# Patient Record
Sex: MALE | Race: WHITE | NOT HISPANIC OR LATINO | Employment: OTHER | URBAN - METROPOLITAN AREA
[De-identification: names, ages, dates, MRNs, and addresses within clinical notes are randomized per-mention and may not be internally consistent; named-entity substitution may affect disease eponyms.]

---

## 2017-02-24 ENCOUNTER — OFFICE VISIT (OUTPATIENT)
Dept: URGENT CARE | Facility: CLINIC | Age: 52
End: 2017-02-24
Payer: COMMERCIAL

## 2017-02-24 VITALS
OXYGEN SATURATION: 95 % | SYSTOLIC BLOOD PRESSURE: 118 MMHG | BODY MASS INDEX: 23.37 KG/M2 | RESPIRATION RATE: 16 BRPM | DIASTOLIC BLOOD PRESSURE: 80 MMHG | TEMPERATURE: 97.9 F | HEART RATE: 62 BPM | WEIGHT: 180 LBS

## 2017-02-24 DIAGNOSIS — S49.92XA SHOULDER INJURY, LEFT, INITIAL ENCOUNTER: ICD-10-CM

## 2017-02-24 PROCEDURE — 99213 OFFICE O/P EST LOW 20 MIN: CPT | Performed by: NURSE PRACTITIONER

## 2017-02-24 ASSESSMENT — ENCOUNTER SYMPTOMS: CONSTITUTIONAL NEGATIVE: 1

## 2017-02-24 NOTE — MR AVS SNAPSHOT
Sharath Salazar   2017 5:15 PM   Office Visit   MRN: [id number]    Department:  [facility]   Dept Phone:  [phone]    Description:  Male : 1965   Provider:  Cathey J Hamman, A.P.N.           Reason for Visit     Shoulder Pain x yesterday, Lt. shoulder pain after fall      Allergies as of 2017     Allergen Noted Reactions    Nkda [No Known Drug Allergy] 2012         Vital Signs     Blood Pressure Pulse Temperature Respirations Weight Oxygen Saturation    118/80 mmHg 62 36.6 °C (97.9 °F) 16 81.647 kg (180 lb) 95%    Smoking Status                   Never Smoker            Basic Information     Date Of Birth Sex Race Ethnicity Preferred Language    1965 Male White Non- English      Problem List              ICD-10-CM Priority Class Noted - Resolved    V-tach (CMS-HCC) I47.2   2012 - Present    Encounter for servicing of automatic implantable cardioverter-defibrillator (AICD) at end of battery life Z45.02   2012 - Present      Health Maintenance        Date Due Completion Dates    IMM DTaP/Tdap/Td Vaccine (1 - Tdap) 1985 ---    IMM INFLUENZA (1) 2016 ---    COLONOSCOPY 2016 ---            Current Immunizations     No immunizations on file.      Below and/or attached are the medications your provider expects you to take. Review all of your home medications and newly ordered medications with your provider and/or pharmacist. Follow medication instructions as directed by your provider and/or pharmacist. Please keep your medication list with you and share with your provider. Update the information when medications are discontinued, doses are changed, or new medications (including over-the-counter products) are added; and carry medication information at all times in the event of emergency situations     Allergies:  NKDA - (reactions not documented)               Medications  Valid as of: 2017 -  7:03 PM    Generic Name Brand Name  Tablet Size Instructions for use    Acetaminophen   Take  by mouth.        DiphenhydrAMINE HCl (Cap) BENADRYL 50 MG Take 50 mg by mouth every 6 hours as needed.          Hydrocodone-Acetaminophen (Tab) NORCO 5-325 MG Take 1-2 Tabs by mouth every four hours as needed.        Penicillin V Potassium (Tab) VEETID 500 MG Take 1 Tab by mouth 4 Times a Day,Before Meals and at Bedtime.        .                 Medicines prescribed today were sent to:     [id number] - [place], [place] - [address]    [address] [place] [address]    Phone: [phone] Fax: [phone]    Open 24 Hours?: No      Medication refill instructions:       If your prescription bottle indicates you have medication refills left, it is not necessary to call your provider’s office. Please contact your pharmacy and they will refill your medication.    If your prescription bottle indicates you do not have any refills left, you may request refills at any time through one of the following ways: The online [employer] system (except Urgent Care), by calling your provider’s office, or by asking your pharmacy to contact your provider’s office with a refill request. Medication refills are processed only during regular business hours and may not be available until the next business day. Your provider may request additional information or to have a follow-up visit with you prior to refilling your medication.   *Please Note: Medication refills are assigned a new Rx number when refilled electronically. Your pharmacy may indicate that no refills were authorized even though a new prescription for the same medication is available at the pharmacy. Please request the medicine by name with the pharmacy before contacting your provider for a refill.           [employer] Access Code: [email or web address]  Expires: 3/26/2017  7:03 PM    [employer]  A secure, online tool to manage your health information     [employer]’s [employer]® is a secure, online tool that connects  you to your personalized health information from the privacy of your home -- day or night - making it very easy for you to manage your healthcare. Once the activation process is completed, you can even access your medical information using the [employer] minnie, which is available for free in the Apple Minnie store or Google Play store.     [employer] provides the following levels of access (as shown below):   My Chart Features   [facility] Doctor [facility]  Specialists [facility]  Urgent  Care Non-[facility]  Primary Care  Doctor   Email your healthcare team securely and privately 24/7 X X X    Manage appointments: schedule your next appointment; view details of past/upcoming appointments X      Request prescription refills. X      View recent personal medical records, including lab and immunizations X X X X   View health record, including health history, allergies, medications X X X X   Read reports about your outpatient visits, procedures, consult and ER notes X X X X   See your discharge summary, which is a recap of your hospital and/or ER visit that includes your diagnosis, lab results, and care plan. X X       How to register for [employer]:  1. Go to  [email or web address].  2. Click on the Sign Up Now box, which takes you to the New Member Sign Up page. You will need to provide the following information:  a. Enter your [employer] Access Code exactly as it appears at the top of this page. (You will not need to use this code after you’ve completed the sign-up process. If you do not sign up before the expiration date, you must request a new code.)   b. Enter your date of birth.   c. Enter your home email address.   d. Click Submit, and follow the next screen’s instructions.  3. Create a [employer] ID. This will be your [employer] login ID and cannot be changed, so think of one that is secure and easy to remember.  4. Create a [employer] password. You can change your password at any time.  5. Enter your Password Reset Question and  Answer. This can be used at a later time if you forget your password.   6. Enter your e-mail address. This allows you to receive e-mail notifications when new information is available in [employer].  7. Click Sign Up. You can now view your health information.    For assistance activating your [employer] account, call [phone]

## 2017-02-25 NOTE — PROGRESS NOTES
Subjective:      Sharath Salazar is a 51 y.o. male who presents with Shoulder Pain    Past Medical History   Diagnosis Date   • Arrhythmia      Social History     Social History   • Marital Status:      Spouse Name: N/A   • Number of Children: N/A   • Years of Education: N/A     Occupational History   • Not on file.     Social History Main Topics   • Smoking status: Never Smoker    • Smokeless tobacco: Never Used   • Alcohol Use: 0.5 - 2.5 oz/week     1-5 drink(s) per week   • Drug Use: No   • Sexual Activity:     Partners: Female     Other Topics Concern   • Not on file     Social History Narrative     Family hx: not pertinent to today's visit         Shoulder Pain  This is a new problem. The current episode started yesterday. The problem occurs constantly. The problem has been unchanged. Nothing aggravates the symptoms. He has tried ice, rest and NSAIDs for the symptoms. The treatment provided mild relief.       Review of Systems   Constitutional: Negative.    Musculoskeletal:        Left shoulder pain       All other systems reviewed and are negative      Objective:     /80 mmHg  Pulse 62  Temp(Src) 36.6 °C (97.9 °F)  Resp 16  Wt 81.647 kg (180 lb)  SpO2 95%     Physical Exam   Constitutional: He is oriented to person, place, and time. He appears well-developed and well-nourished. No distress.   Musculoskeletal:        Left shoulder: He exhibits decreased range of motion.        Arms:   5/5 and equal in the upper extremities. Push pull is 5/5 and equal in the upper extremities. There is no deformity noted over the left shoulder. There is mild point tenderness over the anterior left shoulder. Patient is unable to abduct anterior or laterally greater than 20°. Posterior abduction is not compromised. Patient is not able to adduct across the midline.   Neurological: He is alert and oriented to person, place, and time.   Skin: Skin is warm and dry. He is not diaphoretic.   Psychiatric: He  has a normal mood and affect. His behavior is normal.   Vitals reviewed.    Discussed doing a shoulder x-ray with the patient. As he is not having pain, he declined x-ray at this time. He states he has had multiple x-rays throughout his life and he does not want to have any possible and necessary x-rays. Patient states also that he cannot have an MRI due to an implanted pacemaker. At this time, I discussed conservative treatment with the patient to include ice and NSAIDs with rest. I advised the patient that if his symptoms are not improving within the next 10 days to 2 weeks that I do recommend follow-up. I advised him that he may either follow up with us or with orthopedics. Patient does request referral to orthopedics at this time and that was placed. Patient has no further questions at this time.          Assessment/Plan:   Left shoulder injury  Left shoulder sprain  -Referral to orthopedics  -NSAIDs  -Ice  -Rest  -Follow up otherwise for any further questions or concerns  There are no diagnoses linked to this encounter.

## 2017-05-11 PROBLEM — C44.519 BASAL CELL CARCINOMA OF SKIN OF OTHER PART OF TRUNK: Status: ACTIVE | Noted: 2017-05-11

## 2017-05-25 PROBLEM — D49.2 NEOPLASM OF UNSPECIFIED BEHAVIOR OF BONE, SOFT TISSUE, AND SKIN: Status: RESOLVED | Noted: 2017-05-11 | Resolved: 2017-05-25

## 2017-11-29 ENCOUNTER — OFFICE VISIT (OUTPATIENT)
Dept: MEDICAL GROUP | Facility: MEDICAL CENTER | Age: 52
End: 2017-11-29
Payer: COMMERCIAL

## 2017-11-29 VITALS
TEMPERATURE: 98.6 F | BODY MASS INDEX: 24.94 KG/M2 | SYSTOLIC BLOOD PRESSURE: 110 MMHG | HEIGHT: 73 IN | OXYGEN SATURATION: 94 % | RESPIRATION RATE: 16 BRPM | DIASTOLIC BLOOD PRESSURE: 60 MMHG | HEART RATE: 61 BPM | WEIGHT: 188.2 LBS

## 2017-11-29 DIAGNOSIS — Z98.890 HISTORY OF SHOULDER SURGERY: ICD-10-CM

## 2017-11-29 DIAGNOSIS — Z95.810 AICD (AUTOMATIC CARDIOVERTER/DEFIBRILLATOR) PRESENT: ICD-10-CM

## 2017-11-29 DIAGNOSIS — Z13.6 SCREENING FOR CARDIOVASCULAR CONDITION: ICD-10-CM

## 2017-11-29 DIAGNOSIS — Z12.5 SCREENING PSA (PROSTATE SPECIFIC ANTIGEN): ICD-10-CM

## 2017-11-29 DIAGNOSIS — Z00.00 WELLNESS EXAMINATION: ICD-10-CM

## 2017-11-29 DIAGNOSIS — Z30.09 ENCOUNTER FOR VASECTOMY COUNSELING: ICD-10-CM

## 2017-11-29 DIAGNOSIS — Z23 NEED FOR INFLUENZA VACCINATION: ICD-10-CM

## 2017-11-29 DIAGNOSIS — Z12.11 SCREENING FOR COLON CANCER: ICD-10-CM

## 2017-11-29 PROCEDURE — 99396 PREV VISIT EST AGE 40-64: CPT | Mod: 25 | Performed by: PHYSICIAN ASSISTANT

## 2017-11-29 PROCEDURE — 90471 IMMUNIZATION ADMIN: CPT | Performed by: PHYSICIAN ASSISTANT

## 2017-11-29 PROCEDURE — 90686 IIV4 VACC NO PRSV 0.5 ML IM: CPT | Performed by: PHYSICIAN ASSISTANT

## 2017-11-29 ASSESSMENT — PATIENT HEALTH QUESTIONNAIRE - PHQ9: CLINICAL INTERPRETATION OF PHQ2 SCORE: 0

## 2017-11-29 NOTE — ASSESSMENT & PLAN NOTE
Left shoulder. Over the summer. Torn bicips tendon and rotator cuff. Doing well. Still in PT. Dr. Saavedra.

## 2017-11-29 NOTE — PROGRESS NOTES
Chief Complaint   Patient presents with   • Establish Care     due for labs   • Annual Exam       HISTORY OF THE PRESENT ILLNESS: This is a 52 y.o. male new patient to our practice. This pleasant patient is here today for general PE, wellness exam    No concerns. Doing well. Seeing derm yearly. Needs colonoscopy. Would like vasectomy. Due for regular labs    History of shoulder surgery  Left shoulder. Over the summer. Torn bicips tendon and rotator cuff. Doing well. Still in PT. Dr. Saavedra.    AICD (automatic cardioverter/defibrillator) present  Hx of idiopathic v tach. Regular checks with cardiology. No concerns today.      Past Medical History:   Diagnosis Date   • Arrhythmia        Past Surgical History:   Procedure Laterality Date   • SHOULDER ARTHROSCOPY W/ ROTATOR CUFF REPAIR         Family Status   Relation Status   • Mother Alive   • Father Alive     Family History   Problem Relation Age of Onset   • No Known Problems Mother    • No Known Problems Father        Social History   Substance Use Topics   • Smoking status: Never Smoker   • Smokeless tobacco: Never Used   • Alcohol use 0.5 - 2.5 oz/week     1 - 5 Standard drinks or equivalent per week      Comment: rarely       Allergies: Nkda [no known drug allergy]    No current [employer]-ordered outpatient prescriptions on file.     No current [employer]-ordered facility-administered medications on file.        Review of Systems   Constitutional: Negative for fever, chills, weight loss and malaise/fatigue.   HENT: Negative for ear pain, nosebleeds, congestion, sore throat and neck pain.    Eyes: Negative for blurred vision.   Respiratory: Negative for cough, sputum production, shortness of breath and wheezing.    Cardiovascular: Negative for chest pain, palpitations, orthopnea and leg swelling.   Gastrointestinal: Negative for heartburn, nausea, vomiting and abdominal pain.   Genitourinary: Negative for dysuria, urgency and frequency.   Musculoskeletal: Negative for  "myalgias, back pain and joint pain.   Skin: Negative for rash and itching.   Neurological: Negative for dizziness, tingling, tremors, sensory change, focal weakness and headaches.   Endo/Heme/Allergies: Does not bruise/bleed easily.   Psychiatric/Behavioral: Negative for depression, anxiety, or memory loss.     All other systems reviewed and are negative except as in HPI.    Exam: Blood pressure 110/60, pulse 61, temperature 37 °C (98.6 °F), resp. rate 16, height 1.854 m (6' 1\"), weight 85.4 kg (188 lb 3.2 oz), SpO2 94 %.  General: Normal appearing. No distress.  HEENT: Normocephalic. Eyes conjunctiva clear lids without ptosis, pupils equal and reactive to light accommodation, ears normal shape and contour, canals are clear bilaterally, tympanic membranes are benign, nasal mucosa benign, oropharynx is without erythema, edema or exudates.   Neck: Supple without JVD or bruit. Thyroid is not enlarged.  Pulmonary: Clear to ausculation.  Normal effort. No rales, ronchi, or wheezing.  Cardiovascular: Regular rate and rhythm without murmur. Carotid and radial pulses are intact and equal bilaterally.  Neurologic: Grossly nonfocal  Lymph: No cervical, supraclavicular or axillary lymph nodes are palpable  Skin: Warm and dry.  No obvious lesions.  Musculoskeletal: Normal gait. No extremity cyanosis, clubbing, or edema.  Psych: Normal mood and affect. Alert and oriented x3. Judgment and insight is normal.      Assessment/Plan  1. Wellness examination  CBC WITH DIFFERENTIAL    COMP METABOLIC PANEL    TSH WITH REFLEX TO FT4    VITAMIN D,25 HYDROXY   2. Screening for cardiovascular condition  LIPID PROFILE   3. Screening PSA (prostate specific antigen)  PROSTATE SPECIFIC AG SCREENING   4. Screening for colon cancer  REFERRAL TO GASTROENTEROLOGY   5. Encounter for vasectomy counseling  REFERRAL TO UROLOGY   6. Need for influenza vaccination  INFLUENZA VACCINE QUAD INJ >3Y(PF)   7. AICD (automatic cardioverter/defibrillator) present "     8. History of shoulder surgery       F/u yearly and PRN

## 2017-12-01 ENCOUNTER — HOSPITAL ENCOUNTER (OUTPATIENT)
Dept: LAB | Facility: MEDICAL CENTER | Age: 52
End: 2017-12-01
Attending: PHYSICIAN ASSISTANT
Payer: COMMERCIAL

## 2017-12-01 DIAGNOSIS — Z12.5 SCREENING PSA (PROSTATE SPECIFIC ANTIGEN): ICD-10-CM

## 2017-12-01 DIAGNOSIS — Z00.00 WELLNESS EXAMINATION: ICD-10-CM

## 2017-12-01 DIAGNOSIS — Z13.6 SCREENING FOR CARDIOVASCULAR CONDITION: ICD-10-CM

## 2017-12-01 LAB
25(OH)D3 SERPL-MCNC: 31 NG/ML (ref 30–100)
ALBUMIN SERPL BCP-MCNC: 4.2 G/DL (ref 3.2–4.9)
ALBUMIN/GLOB SERPL: 1.8 G/DL
ALP SERPL-CCNC: 51 U/L (ref 30–99)
ALT SERPL-CCNC: 17 U/L (ref 2–50)
ANION GAP SERPL CALC-SCNC: 8 MMOL/L (ref 0–11.9)
AST SERPL-CCNC: 20 U/L (ref 12–45)
BASOPHILS # BLD AUTO: 0.7 % (ref 0–1.8)
BASOPHILS # BLD: 0.04 K/UL (ref 0–0.12)
BILIRUB SERPL-MCNC: 0.7 MG/DL (ref 0.1–1.5)
BUN SERPL-MCNC: 24 MG/DL (ref 8–22)
CALCIUM SERPL-MCNC: 9.6 MG/DL (ref 8.5–10.5)
CHLORIDE SERPL-SCNC: 103 MMOL/L (ref 96–112)
CHOLEST SERPL-MCNC: 218 MG/DL (ref 100–199)
CO2 SERPL-SCNC: 29 MMOL/L (ref 20–33)
CREAT SERPL-MCNC: 0.87 MG/DL (ref 0.5–1.4)
EOSINOPHIL # BLD AUTO: 0.33 K/UL (ref 0–0.51)
EOSINOPHIL NFR BLD: 5.8 % (ref 0–6.9)
ERYTHROCYTE [DISTWIDTH] IN BLOOD BY AUTOMATED COUNT: 44.8 FL (ref 35.9–50)
GFR SERPL CREATININE-BSD FRML MDRD: >60 ML/MIN/1.73 M 2
GLOBULIN SER CALC-MCNC: 2.4 G/DL (ref 1.9–3.5)
GLUCOSE SERPL-MCNC: 82 MG/DL (ref 65–99)
HCT VFR BLD AUTO: 52.5 % (ref 42–52)
HDLC SERPL-MCNC: 52 MG/DL
HGB BLD-MCNC: 17.5 G/DL (ref 14–18)
IMM GRANULOCYTES # BLD AUTO: 0.03 K/UL (ref 0–0.11)
IMM GRANULOCYTES NFR BLD AUTO: 0.5 % (ref 0–0.9)
LDLC SERPL CALC-MCNC: 142 MG/DL
LYMPHOCYTES # BLD AUTO: 1.45 K/UL (ref 1–4.8)
LYMPHOCYTES NFR BLD: 25.5 % (ref 22–41)
MCH RBC QN AUTO: 30.6 PG (ref 27–33)
MCHC RBC AUTO-ENTMCNC: 33.3 G/DL (ref 33.7–35.3)
MCV RBC AUTO: 91.9 FL (ref 81.4–97.8)
MONOCYTES # BLD AUTO: 0.47 K/UL (ref 0–0.85)
MONOCYTES NFR BLD AUTO: 8.3 % (ref 0–13.4)
NEUTROPHILS # BLD AUTO: 3.36 K/UL (ref 1.82–7.42)
NEUTROPHILS NFR BLD: 59.2 % (ref 44–72)
NRBC # BLD AUTO: 0 K/UL
NRBC BLD AUTO-RTO: 0 /100 WBC
PLATELET # BLD AUTO: 256 K/UL (ref 164–446)
PMV BLD AUTO: 11.7 FL (ref 9–12.9)
POTASSIUM SERPL-SCNC: 4.1 MMOL/L (ref 3.6–5.5)
PROT SERPL-MCNC: 6.6 G/DL (ref 6–8.2)
PSA SERPL-MCNC: 0.81 NG/ML (ref 0–4)
RBC # BLD AUTO: 5.71 M/UL (ref 4.7–6.1)
SODIUM SERPL-SCNC: 140 MMOL/L (ref 135–145)
TRIGL SERPL-MCNC: 121 MG/DL (ref 0–149)
TSH SERPL DL<=0.005 MIU/L-ACNC: 1.99 UIU/ML (ref 0.3–3.7)
WBC # BLD AUTO: 5.7 K/UL (ref 4.8–10.8)

## 2017-12-01 PROCEDURE — 85025 COMPLETE CBC W/AUTO DIFF WBC: CPT

## 2017-12-01 PROCEDURE — 82306 VITAMIN D 25 HYDROXY: CPT

## 2017-12-01 PROCEDURE — 84153 ASSAY OF PSA TOTAL: CPT

## 2017-12-01 PROCEDURE — 80061 LIPID PANEL: CPT

## 2017-12-01 PROCEDURE — 36415 COLL VENOUS BLD VENIPUNCTURE: CPT

## 2017-12-01 PROCEDURE — 80053 COMPREHEN METABOLIC PANEL: CPT

## 2017-12-01 PROCEDURE — 84443 ASSAY THYROID STIM HORMONE: CPT

## 2017-12-07 ENCOUNTER — APPOINTMENT (RX ONLY)
Dept: URBAN - METROPOLITAN AREA CLINIC 4 | Facility: CLINIC | Age: 52
Setting detail: DERMATOLOGY
End: 2017-12-07

## 2017-12-07 DIAGNOSIS — L57.0 ACTINIC KERATOSIS: ICD-10-CM

## 2017-12-07 PROCEDURE — ? LIQUID NITROGEN

## 2017-12-07 PROCEDURE — ? PRESCRIPTION

## 2017-12-07 PROCEDURE — 17004 DESTROY PREMAL LESIONS 15/>: CPT

## 2017-12-07 RX ORDER — INGENOL MEBUTATE 150 UG/G
GEL TOPICAL
Qty: 1 | Refills: 6 | Status: ERX | COMMUNITY
Start: 2017-12-07

## 2017-12-07 RX ADMIN — INGENOL MEBUTATE: 150 GEL TOPICAL at 16:22

## 2017-12-07 ASSESSMENT — LOCATION DETAILED DESCRIPTION DERM
LOCATION DETAILED: RIGHT SUPERIOR CENTRAL BUCCAL CHEEK
LOCATION DETAILED: LEFT MEDIAL FOREHEAD
LOCATION DETAILED: LEFT INFERIOR CENTRAL MALAR CHEEK
LOCATION DETAILED: LEFT FOREHEAD
LOCATION DETAILED: LEFT SUPERIOR OCCIPITAL SCALP
LOCATION DETAILED: LEFT LATERAL TEMPLE
LOCATION DETAILED: RIGHT MEDIAL FOREHEAD
LOCATION DETAILED: RIGHT INFERIOR FOREHEAD
LOCATION DETAILED: LEFT CENTRAL ZYGOMA
LOCATION DETAILED: LEFT SUPERIOR PARIETAL SCALP
LOCATION DETAILED: RIGHT FOREHEAD
LOCATION DETAILED: RIGHT SUPERIOR CENTRAL MALAR CHEEK
LOCATION DETAILED: RIGHT INFERIOR CENTRAL MALAR CHEEK
LOCATION DETAILED: RIGHT CAVUM CONCHA
LOCATION DETAILED: NASAL DORSUM
LOCATION DETAILED: LEFT CENTRAL PARIETAL SCALP
LOCATION DETAILED: LEFT LATERAL ZYGOMA
LOCATION DETAILED: LEFT CENTRAL TEMPLE
LOCATION DETAILED: RIGHT MEDIAL ZYGOMA
LOCATION DETAILED: RIGHT SUPERIOR PARIETAL SCALP
LOCATION DETAILED: LEFT LATERAL MANDIBULAR CHEEK
LOCATION DETAILED: LEFT INFERIOR LATERAL MALAR CHEEK
LOCATION DETAILED: LEFT NASAL SIDEWALL

## 2017-12-07 ASSESSMENT — LOCATION SIMPLE DESCRIPTION DERM
LOCATION SIMPLE: NOSE
LOCATION SIMPLE: RIGHT CHEEK
LOCATION SIMPLE: RIGHT ZYGOMA
LOCATION SIMPLE: LEFT CHEEK
LOCATION SIMPLE: LEFT NOSE
LOCATION SIMPLE: SCALP
LOCATION SIMPLE: LEFT ZYGOMA
LOCATION SIMPLE: LEFT OCCIPITAL SCALP
LOCATION SIMPLE: RIGHT FOREHEAD
LOCATION SIMPLE: RIGHT EAR
LOCATION SIMPLE: LEFT TEMPLE
LOCATION SIMPLE: LEFT FOREHEAD

## 2017-12-07 ASSESSMENT — LOCATION ZONE DERM
LOCATION ZONE: SCALP
LOCATION ZONE: NOSE
LOCATION ZONE: EAR
LOCATION ZONE: FACE

## 2018-04-05 ENCOUNTER — APPOINTMENT (RX ONLY)
Dept: URBAN - METROPOLITAN AREA CLINIC 4 | Facility: CLINIC | Age: 53
Setting detail: DERMATOLOGY
End: 2018-04-05

## 2018-04-05 DIAGNOSIS — L81.4 OTHER MELANIN HYPERPIGMENTATION: ICD-10-CM

## 2018-04-05 DIAGNOSIS — D22 MELANOCYTIC NEVI: ICD-10-CM

## 2018-04-05 DIAGNOSIS — L82.1 OTHER SEBORRHEIC KERATOSIS: ICD-10-CM

## 2018-04-05 DIAGNOSIS — D18.0 HEMANGIOMA: ICD-10-CM

## 2018-04-05 DIAGNOSIS — L57.0 ACTINIC KERATOSIS: ICD-10-CM

## 2018-04-05 PROBLEM — D22.62 MELANOCYTIC NEVI OF LEFT UPPER LIMB, INCLUDING SHOULDER: Status: ACTIVE | Noted: 2018-04-05

## 2018-04-05 PROBLEM — D22.61 MELANOCYTIC NEVI OF RIGHT UPPER LIMB, INCLUDING SHOULDER: Status: ACTIVE | Noted: 2018-04-05

## 2018-04-05 PROBLEM — D22.5 MELANOCYTIC NEVI OF TRUNK: Status: ACTIVE | Noted: 2018-04-05

## 2018-04-05 PROBLEM — D18.01 HEMANGIOMA OF SKIN AND SUBCUTANEOUS TISSUE: Status: ACTIVE | Noted: 2018-04-05

## 2018-04-05 PROCEDURE — ? COUNSELING

## 2018-04-05 PROCEDURE — 17004 DESTROY PREMAL LESIONS 15/>: CPT

## 2018-04-05 PROCEDURE — ? LIQUID NITROGEN

## 2018-04-05 PROCEDURE — 99213 OFFICE O/P EST LOW 20 MIN: CPT | Mod: 25

## 2018-04-05 ASSESSMENT — LOCATION DETAILED DESCRIPTION DERM
LOCATION DETAILED: RIGHT MEDIAL TEMPLE
LOCATION DETAILED: LEFT SUPERIOR PARIETAL SCALP
LOCATION DETAILED: LEFT VENTRAL PROXIMAL FOREARM
LOCATION DETAILED: RIGHT SUPERIOR CENTRAL MALAR CHEEK
LOCATION DETAILED: RIGHT MEDIAL ZYGOMA
LOCATION DETAILED: LEFT MEDIAL INFERIOR CHEST
LOCATION DETAILED: LEFT ANTERIOR DISTAL UPPER ARM
LOCATION DETAILED: LEFT RADIAL DORSAL HAND
LOCATION DETAILED: LEFT INFERIOR LATERAL NECK
LOCATION DETAILED: RIGHT INFERIOR MEDIAL FOREHEAD
LOCATION DETAILED: RIGHT VENTRAL PROXIMAL FOREARM
LOCATION DETAILED: LEFT INFERIOR UPPER BACK
LOCATION DETAILED: LEFT DISTAL DORSAL FOREARM
LOCATION DETAILED: LEFT CENTRAL FRONTAL SCALP
LOCATION DETAILED: LEFT PROXIMAL DORSAL FOREARM
LOCATION DETAILED: RIGHT ULNAR DORSAL HAND
LOCATION DETAILED: LEFT ULNAR DORSAL HAND
LOCATION DETAILED: LEFT SUPERIOR MEDIAL FOREHEAD
LOCATION DETAILED: LEFT INFERIOR NASAL CHEEK
LOCATION DETAILED: SUPERIOR LUMBAR SPINE
LOCATION DETAILED: RIGHT SUPERIOR PARIETAL SCALP
LOCATION DETAILED: LEFT PROXIMAL RADIAL DORSAL FOREARM
LOCATION DETAILED: EPIGASTRIC SKIN
LOCATION DETAILED: RIGHT CENTRAL MALAR CHEEK
LOCATION DETAILED: LEFT VENTRAL DISTAL FOREARM
LOCATION DETAILED: RIGHT SUPERIOR MEDIAL MIDBACK
LOCATION DETAILED: RIGHT RADIAL DORSAL HAND
LOCATION DETAILED: LEFT ANTECUBITAL SKIN
LOCATION DETAILED: RIGHT INFERIOR FOREHEAD
LOCATION DETAILED: RIGHT ANTERIOR DISTAL UPPER ARM
LOCATION DETAILED: LEFT MEDIAL FRONTAL SCALP

## 2018-04-05 ASSESSMENT — LOCATION SIMPLE DESCRIPTION DERM
LOCATION SIMPLE: LEFT UPPER BACK
LOCATION SIMPLE: LEFT ELBOW
LOCATION SIMPLE: LEFT ANTERIOR NECK
LOCATION SIMPLE: LEFT HAND
LOCATION SIMPLE: LEFT SCALP
LOCATION SIMPLE: RIGHT ZYGOMA
LOCATION SIMPLE: SCALP
LOCATION SIMPLE: LEFT UPPER ARM
LOCATION SIMPLE: RIGHT HAND
LOCATION SIMPLE: LOWER BACK
LOCATION SIMPLE: CHEST
LOCATION SIMPLE: RIGHT LOWER BACK
LOCATION SIMPLE: LEFT FOREARM
LOCATION SIMPLE: RIGHT FOREHEAD
LOCATION SIMPLE: LEFT FOREHEAD
LOCATION SIMPLE: LEFT CHEEK
LOCATION SIMPLE: RIGHT CHEEK
LOCATION SIMPLE: ABDOMEN
LOCATION SIMPLE: RIGHT UPPER ARM
LOCATION SIMPLE: RIGHT TEMPLE
LOCATION SIMPLE: RIGHT FOREARM

## 2018-04-05 ASSESSMENT — LOCATION ZONE DERM
LOCATION ZONE: FACE
LOCATION ZONE: NECK
LOCATION ZONE: ARM
LOCATION ZONE: HAND
LOCATION ZONE: TRUNK
LOCATION ZONE: SCALP

## 2022-08-02 ENCOUNTER — APPOINTMENT (OUTPATIENT)
Dept: RADIOLOGY | Facility: MEDICAL CENTER | Age: 57
End: 2022-08-02
Attending: STUDENT IN AN ORGANIZED HEALTH CARE EDUCATION/TRAINING PROGRAM
Payer: COMMERCIAL

## 2022-08-02 ENCOUNTER — HOSPITAL ENCOUNTER (EMERGENCY)
Facility: MEDICAL CENTER | Age: 57
End: 2022-08-03
Attending: STUDENT IN AN ORGANIZED HEALTH CARE EDUCATION/TRAINING PROGRAM
Payer: COMMERCIAL

## 2022-08-02 DIAGNOSIS — S43.102A DISLOCATION OF LEFT ACROMIOCLAVICULAR JOINT, INITIAL ENCOUNTER: ICD-10-CM

## 2022-08-02 DIAGNOSIS — S91.209A AVULSION OF TOENAIL, INITIAL ENCOUNTER: ICD-10-CM

## 2022-08-02 DIAGNOSIS — S09.90XA CLOSED HEAD INJURY, INITIAL ENCOUNTER: ICD-10-CM

## 2022-08-02 PROCEDURE — 73620 X-RAY EXAM OF FOOT: CPT | Mod: RT

## 2022-08-02 PROCEDURE — 73030 X-RAY EXAM OF SHOULDER: CPT | Mod: LT

## 2022-08-02 PROCEDURE — 11740 EVACUATION SUBUNGUAL HMTMA: CPT

## 2022-08-02 PROCEDURE — 99284 EMERGENCY DEPT VISIT MOD MDM: CPT

## 2022-08-02 PROCEDURE — 304999 HCHG REPAIR-SIMPLE/INTERMED LEVEL 1

## 2022-08-02 PROCEDURE — 700102 HCHG RX REV CODE 250 W/ 637 OVERRIDE(OP): Performed by: STUDENT IN AN ORGANIZED HEALTH CARE EDUCATION/TRAINING PROGRAM

## 2022-08-02 PROCEDURE — 305948 HCHG GREEN TRAUMA ACT PRE-NOTIFY NO CC

## 2022-08-02 PROCEDURE — A9270 NON-COVERED ITEM OR SERVICE: HCPCS | Performed by: STUDENT IN AN ORGANIZED HEALTH CARE EDUCATION/TRAINING PROGRAM

## 2022-08-02 PROCEDURE — 303353 HCHG DERMABOND SKIN ADHESIVE

## 2022-08-02 PROCEDURE — 71045 X-RAY EXAM CHEST 1 VIEW: CPT

## 2022-08-02 PROCEDURE — 70450 CT HEAD/BRAIN W/O DYE: CPT

## 2022-08-02 RX ORDER — OXYCODONE HYDROCHLORIDE AND ACETAMINOPHEN 5; 325 MG/1; MG/1
1 TABLET ORAL ONCE
Status: COMPLETED | OUTPATIENT
Start: 2022-08-02 | End: 2022-08-02

## 2022-08-02 RX ADMIN — OXYCODONE HYDROCHLORIDE AND ACETAMINOPHEN 1 TABLET: 5; 325 TABLET ORAL at 22:56

## 2022-08-03 VITALS
RESPIRATION RATE: 17 BRPM | WEIGHT: 190 LBS | HEIGHT: 73 IN | TEMPERATURE: 97 F | SYSTOLIC BLOOD PRESSURE: 116 MMHG | OXYGEN SATURATION: 97 % | BODY MASS INDEX: 25.18 KG/M2 | HEART RATE: 72 BPM | DIASTOLIC BLOOD PRESSURE: 69 MMHG

## 2022-08-03 RX ORDER — ACETAMINOPHEN 500 MG
500-1000 TABLET ORAL EVERY 6 HOURS PRN
Qty: 30 TABLET | Refills: 0 | Status: SHIPPED | OUTPATIENT
Start: 2022-08-03 | End: 2023-05-22

## 2022-08-03 RX ORDER — DOCUSATE SODIUM 100 MG/1
100 CAPSULE, LIQUID FILLED ORAL 2 TIMES DAILY
Qty: 60 CAPSULE | Refills: 0 | Status: SHIPPED | OUTPATIENT
Start: 2022-08-03 | End: 2023-05-22

## 2022-08-03 RX ORDER — LIDOCAINE 50 MG/G
1 PATCH TOPICAL EVERY 24 HOURS
Qty: 10 PATCH | Refills: 0 | Status: SHIPPED | OUTPATIENT
Start: 2022-08-03 | End: 2023-05-22

## 2022-08-03 RX ORDER — IBUPROFEN 600 MG/1
600 TABLET ORAL EVERY 6 HOURS PRN
Qty: 30 TABLET | Refills: 0 | Status: SHIPPED | OUTPATIENT
Start: 2022-08-03 | End: 2023-05-22

## 2022-08-03 RX ORDER — HYDROCODONE BITARTRATE AND ACETAMINOPHEN 5; 325 MG/1; MG/1
1 TABLET ORAL EVERY 6 HOURS PRN
Qty: 12 TABLET | Refills: 0 | Status: SHIPPED | OUTPATIENT
Start: 2022-08-03 | End: 2022-08-06

## 2022-08-03 NOTE — ED NOTES
Pt attempted to stand to leave, pt became cool, pale and diaphoretic. Pt reported lightheadedness.   Pt was hypotensive, ERP called to room.     Pt now resting in gurney with PO fluids and coke. On monitor

## 2022-08-03 NOTE — ED NOTES
"Pt reports feeling much better, denies lightheadedness or dizziness. Pt's color has returned to w/p/d. Pt able to transfer self to w/c    Pt discharged to lobby via w/c. IV discontinued and gauze placed, pt in possession of belongings. Pt provided discharge education and information pertaining to medications and follow up appointments. Pt received copy of discharge instructions and verbalized understanding. /69   Pulse 66   Temp 36.1 °C (97 °F)   Resp 17   Ht 1.854 m (6' 1\")   Wt 86.2 kg (190 lb)   SpO2 94%   BMI 25.07 kg/m²   "
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
## 2022-09-16 ENCOUNTER — PHYSICAL THERAPY (OUTPATIENT)
Dept: PHYSICAL THERAPY | Facility: REHABILITATION | Age: 57
End: 2022-09-16
Attending: INTERNAL MEDICINE
Payer: COMMERCIAL

## 2022-09-16 DIAGNOSIS — F07.81 POST CONCUSSIVE SYNDROME: ICD-10-CM

## 2022-09-16 DIAGNOSIS — M25.512 ACUTE PAIN OF LEFT SHOULDER: ICD-10-CM

## 2022-09-16 PROCEDURE — 97161 PT EVAL LOW COMPLEX 20 MIN: CPT

## 2022-09-16 ASSESSMENT — ENCOUNTER SYMPTOMS
PAIN SCALE AT HIGHEST: 4
PAIN SCALE: 0
PAIN SCALE AT LOWEST: 0
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
## 2022-09-20 ENCOUNTER — PHYSICAL THERAPY (OUTPATIENT)
Dept: PHYSICAL THERAPY | Facility: REHABILITATION | Age: 57
End: 2022-09-20
Attending: INTERNAL MEDICINE
Payer: COMMERCIAL

## 2022-09-20 DIAGNOSIS — M25.512 ACUTE PAIN OF LEFT SHOULDER: ICD-10-CM

## 2022-09-20 DIAGNOSIS — F07.81 POST CONCUSSIVE SYNDROME: ICD-10-CM

## 2022-09-20 PROCEDURE — 97112 NEUROMUSCULAR REEDUCATION: CPT

## 2022-09-20 NOTE — OP THERAPY DAILY TREATMENT
"  Outpatient Physical Therapy  DAILY TREATMENT     [facility] Physical [address].  [address]  [place] [address]  Phone:  [phone]  Fax:  [phone]    Date: 09/20/2022    Patient: Sharath Salazar  YOB: 1965  MRN: [id number]     Time Calculation    Start time: 0115  Stop time: 0205 Time Calculation (min): 50 minutes         Chief Complaint: No chief complaint on file.    Visit #: 2    Feel a little better after doing the exercise and reported more difficulty with saccades ex w/ moving backround    OBJECTIVE:  CTSIB --neuro com complete  R gaze x-1 : 39 deg  to L: 45 deg.--loss focus  Convergence 7cm          Therapeutic Treatments and Modalities:     Therapeutic Treatment and Modalities Summary: Reviewed saccades --vertical at end range of L visual field --tongue depressor with visual target--progress to standing--hep  X1-horizontal  VOR in standing  straddle step----hep  X1 horizontal gaze  while walking// unable to mainitain straight line --hep  Savanna string walking--closest bead 3\"  Time-based treatments/modalities:    Physical Therapy Timed Treatment Charges  Neuromusc re-ed, balance, coor, post minutes (CPT 22470): 40 minutes      Pain rating (1-10) before treatment:  0  Pain rating (1-10) after treatment:  0    ASSESSMENT:   Cont. To report increased fogginess with VOR and convergent saccades--able to progress savanna string convergence ex but still limited to about 7-cm from nose    PLAN/RECOMMENDATIONS:   Progress gaze stab and dynamic balance, assess c/s , assess shoulder, convergence dot ladder for HEP Savanna string progression for HEP??           "

## 2022-09-27 ENCOUNTER — APPOINTMENT (OUTPATIENT)
Dept: PHYSICAL THERAPY | Facility: REHABILITATION | Age: 57
End: 2022-09-27
Attending: INTERNAL MEDICINE
Payer: COMMERCIAL

## 2022-09-27 NOTE — OP THERAPY DAILY TREATMENT
"ASSESSMENT:   Cont. To report increased fogginess with VOR and convergent saccades--able to progress savanna string convergence ex but still limited to about 7-cm from nose    PLAN/RECOMMENDATIONS:   Progress gaze stab and dynamic balance, assess c/s , assess shoulder, convergence dot ladder for HEP Savanna string progression for HEP??      Outpatient Physical Therapy  DAILY TREATMENT     [facility] Physical Therapy [address]  [address]  [facility] [address]  Phone:  [phone]  Fax:  [phone]    Date: 09/27/2022    Patient: Sharath Salazar  YOB: 1965  MRN: [id number]     Time Calculation                   Chief Complaint: No chief complaint on file.    Visit #: 3    SUBJECTIVE:  ***    OBJECTIVE:  Current objective measures: ***        Exercises/Treatment  Time-based treatments/modalities:           Pain rating (1-10) before treatment:  {PAIN NUMBERS_1-10:08616}  Pain rating (1-10) after treatment:  {PAIN NUMBERS_1-10:97644}    ASSESSMENT:   Response to treatment: ***    PLAN/RECOMMENDATIONS:   Plan for treatment: {AMB OP THERAPY - THERAPY PLAN:349802797::\"therapy treatment to continue next visit\"}.  Planned interventions for next visit: {PT PLANNED THERAPY INTERVENTIONS:418029750::\"continue with current treatment\"}.         "

## 2022-09-30 ENCOUNTER — PHYSICAL THERAPY (OUTPATIENT)
Dept: PHYSICAL THERAPY | Facility: REHABILITATION | Age: 57
End: 2022-09-30
Attending: INTERNAL MEDICINE
Payer: COMMERCIAL

## 2022-09-30 DIAGNOSIS — F07.81 POST CONCUSSIVE SYNDROME: ICD-10-CM

## 2022-09-30 PROCEDURE — 97112 NEUROMUSCULAR REEDUCATION: CPT

## 2022-09-30 NOTE — OP THERAPY DAILY TREATMENT
"  Outpatient Physical Therapy  DAILY TREATMENT     [facility] Physical [address].  [address]  [place] [address]  Phone:  [phone]  Fax:  [phone]    Date: 09/30/2022    Patient: Sharath Salazar  YOB: 1965  MRN: [id number]     Time Calculation    Start time: 0715  Stop time: 0745 Time Calculation (min): 30 minutes         Chief Complaint: No chief complaint on file.    Visit #: 3    Patient reported trying to progress x2 horizontal gaze but ended up causing headaches and ended stop doing it    OBJECTIVE:  Struggled with L smooth pursuit and any horizontal gaze diomedes > 70 bpm    Convergence 3cm          Therapeutic Treatments and Modalities:     Therapeutic Treatment and Modalities Summary: Instructed patient not to push into symptoms--discussed increased frequency and lessening duration to avoid flare up    Seated L gazed smooth pursuit with focus on L field of vision at horizontal end range with vertical pursuit--15\"-30\"--noted facial fatigue and slight h/a--resolved quickly---pt. To add \"busy\" back round as he tolerates  Convergence ladder-- with changing focal point and L smooth pursuit with end range left gaze stab saccades    Time-based treatments/modalities:    Physical Therapy Timed Treatment Charges  Neuromusc re-ed, balance, coor, post minutes (CPT 17873): 30 minutes      Pain rating (1-10) before treatment:  0  Pain rating (1-10) after treatment:  0    ASSESSMENT:   Cont. To report increased fogginess with VOR and smooth pursuits at end range L gaze stab--improved convergence  PLAN/RECOMMENDATIONS:   Progress gaze stab and dynamic balance, assess c/s , assess shoulder, convergence dot ladder for HEP Moises string progression for HEP??           "

## 2022-10-06 ENCOUNTER — APPOINTMENT (OUTPATIENT)
Dept: PHYSICAL THERAPY | Facility: REHABILITATION | Age: 57
End: 2022-10-06
Attending: INTERNAL MEDICINE
Payer: COMMERCIAL

## 2022-10-14 ENCOUNTER — APPOINTMENT (OUTPATIENT)
Dept: PHYSICAL THERAPY | Facility: REHABILITATION | Age: 57
End: 2022-10-14
Payer: COMMERCIAL

## 2023-05-22 ENCOUNTER — OFFICE VISIT (OUTPATIENT)
Dept: URGENT CARE | Facility: CLINIC | Age: 58
End: 2023-05-22
Payer: COMMERCIAL

## 2023-05-22 VITALS
HEART RATE: 77 BPM | DIASTOLIC BLOOD PRESSURE: 74 MMHG | SYSTOLIC BLOOD PRESSURE: 94 MMHG | OXYGEN SATURATION: 97 % | RESPIRATION RATE: 20 BRPM | WEIGHT: 190 LBS | HEIGHT: 73 IN | BODY MASS INDEX: 25.18 KG/M2 | TEMPERATURE: 97.9 F

## 2023-05-22 DIAGNOSIS — J06.9 VIRAL URI WITH COUGH: ICD-10-CM

## 2023-05-22 PROCEDURE — 3074F SYST BP LT 130 MM HG: CPT | Performed by: FAMILY MEDICINE

## 2023-05-22 PROCEDURE — 99213 OFFICE O/P EST LOW 20 MIN: CPT | Performed by: FAMILY MEDICINE

## 2023-05-22 PROCEDURE — 3078F DIAST BP <80 MM HG: CPT | Performed by: FAMILY MEDICINE

## 2023-05-22 ASSESSMENT — ENCOUNTER SYMPTOMS
COUGH: 1
FEVER: 0
SORE THROAT: 1

## 2023-05-23 NOTE — PROGRESS NOTES
"Subjective:     Sharath Salazar is a 57 y.o. male who presents for Cough (Congestion, goopy eyes, took covid test, x5 days)    HPI  Pt presents for evaluation of an acute problem  Pt with cough for the past 5 days   Cough started mild and slowly increased   No fevers   Having some eye discharge bilaterally   Has a mild sore throat   Cough is mild to moderate   Has mild rhinorrhea   No sick contacts with similar symptoms     Review of Systems   Constitutional:  Negative for fever.   HENT:  Positive for congestion and sore throat.    Respiratory:  Positive for cough.        PMH:  has a past medical history of Arrhythmia.  MEDS: No current outpatient medications on file.  ALLERGIES:   Allergies   Allergen Reactions    Nkda [No Known Drug Allergy]      SURGHX:   Past Surgical History:   Procedure Laterality Date    SHOULDER ARTHROSCOPY W/ ROTATOR CUFF REPAIR       SOCHX:  reports that he has never smoked. He has never used smokeless tobacco. He reports current alcohol use. He reports that he does not use drugs.     Objective:   BP 94/74 (BP Location: Left arm, Patient Position: Sitting, BP Cuff Size: Large adult)   Pulse 77   Temp 36.6 °C (97.9 °F) (Temporal)   Resp 20   Ht 1.854 m (6' 1\")   Wt 86.2 kg (190 lb)   SpO2 97%   BMI 25.07 kg/m²     Physical Exam  Constitutional:       General: He is not in acute distress.     Appearance: He is well-developed. He is not diaphoretic.   HENT:      Head: Normocephalic and atraumatic.      Right Ear: Tympanic membrane, ear canal and external ear normal.      Left Ear: Tympanic membrane, ear canal and external ear normal.      Nose: Nose normal.      Mouth/Throat:      Mouth: Mucous membranes are moist.      Pharynx: Oropharynx is clear. No oropharyngeal exudate or posterior oropharyngeal erythema.   Eyes:      Extraocular Movements: Extraocular movements intact.      Pupils: Pupils are equal, round, and reactive to light.      Comments: Mild scleral injection present " bilaterally with no active discharge   Neck:      Trachea: No tracheal deviation.   Cardiovascular:      Rate and Rhythm: Normal rate and regular rhythm.      Heart sounds: Normal heart sounds.   Pulmonary:      Effort: Pulmonary effort is normal. No respiratory distress.      Breath sounds: Normal breath sounds. No wheezing or rales.   Musculoskeletal:         General: Normal range of motion.      Cervical back: Normal range of motion and neck supple. No tenderness.   Lymphadenopathy:      Cervical: No cervical adenopathy.   Skin:     General: Skin is warm and dry.      Findings: No rash.   Neurological:      Mental Status: He is alert.   Psychiatric:         Behavior: Behavior normal.         Thought Content: Thought content normal.         Judgment: Judgment normal.       Assessment/Plan:   Assessment    1. Viral URI with cough    Patient with viral URI.  Likely adenovirus.  Reviewed supportive care measures and expected course of recovery.  All questions answered and will follow-up in the urgent care as needed.

## 2023-09-05 PROBLEM — M79.672 PAIN OF LEFT FOOT: Status: ACTIVE | Noted: 2023-09-05

## 2023-11-22 ENCOUNTER — TELEPHONE (OUTPATIENT)
Dept: PHYSICAL THERAPY | Facility: REHABILITATION | Age: 58
End: 2023-11-22
Payer: COMMERCIAL

## 2023-11-22 NOTE — OP THERAPY DISCHARGE SUMMARY
Outpatient Physical Therapy  DISCHARGE SUMMARY NOTE      [address].  [address]  [place] [address]  Phone:  [phone]  Fax:  [phone]    Date of Visit: 11/22/2023    Patient: Sharath Salazar  YOB: 1965  MRN: [id number]     Referring Provider: No referring provider defined for this encounter.   Referring Diagnosis No admission diagnoses are documented for this encounter.         Functional Assessment Used        Your patient is being discharged from Physical Therapy with the following comments:   Goals partially met  Patient has failed to schedule or reschedule follow-up visits         Chief Complaint: No chief complaint on file.     Visit #: 3     Patient reported trying to progress x2 horizontal gaze but ended up causing headaches and ended stop doing it     OBJECTIVE:  Struggled with L smooth pursuit and any horizontal gaze diomedes > 70 bpm  Convergence 3cm          ASSESSMENT:   Cont. To report increased fogginess with VOR and smooth pursuits at end range L gaze stab--improved convergence  PLAN/RECOMMENDATIONS:   No patient contact since   922/22/  .  Patient is independent with his HEP and is being discharged from therapy at this time.              Tommie Russell, PT, DPT, OCS    Date: 11/22/2023

## 2023-11-29 ENCOUNTER — PHYSICAL THERAPY (OUTPATIENT)
Dept: PHYSICAL THERAPY | Facility: REHABILITATION | Age: 58
End: 2023-11-29
Attending: ORTHOPAEDIC SURGERY
Payer: COMMERCIAL

## 2023-11-29 DIAGNOSIS — M21.41 FALLEN ARCHES: ICD-10-CM

## 2023-11-29 DIAGNOSIS — M21.42 FALLEN ARCHES: ICD-10-CM

## 2023-11-29 DIAGNOSIS — M79.672 LEFT FOOT PAIN: ICD-10-CM

## 2023-11-29 PROCEDURE — 97110 THERAPEUTIC EXERCISES: CPT

## 2023-11-29 PROCEDURE — 97161 PT EVAL LOW COMPLEX 20 MIN: CPT

## 2023-11-29 ASSESSMENT — ENCOUNTER SYMPTOMS
PAIN SCALE: 1
PAIN SCALE AT LOWEST: 0
PAIN SCALE AT HIGHEST: 8

## 2023-11-29 NOTE — OP THERAPY EVALUATION
Outpatient Physical Therapy  INITIAL EVALUATION    [facility] Physical Therapy [address]  [address]  [place] [address]  Phone:  [phone]  Fax:  [phone]    Date of Evaluation: 2023    Patient: Sharath Salazar  YOB: 1965  MRN: [id number]     Referring Provider: Tyler Torres M.D.  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    Visit Diagnoses     ICD-10-CM   1. Left foot pain  M79.672   2. Fallen arches  M21.41    M21.42       Date of onset of impairment: 2022    Subjective   History of Present Illness:     History of chief complaint:  Patient reports history of L foot pain over the past year that initially flared up with shoveling snow in soft shoes.  Patient reports that he is limitied to walking up to a mile or standing    Prior level of function:  80% seated w/  occasional  walking    Pain:     Current pain ratin    At best pain ratin    At worst pain ratin    Location:  Medial arch, medial /lateral maleoli  and dorsum lateral foot    Aggravating factors:  WAlk more than 1/2 mile w/o pain  Heel raises to a point of pain  Occasoional soreness with biking if too long    LEFS 58/80        Past Medical History:   Diagnosis Date    Arrhythmia      Past Surgical History:   Procedure Laterality Date    SHOULDER ARTHROSCOPY W/ ROTATOR CUFF REPAIR         Precautions:  Cardiac Precautions: implantable defibrilator    Objective   Observation and functional movement:  Noted internal tibial moment with mid stance with decreased medial longitudinal arch noted clawing of toes and forefoot 'searched for the floor'    Range of motion and strength:    AROM  DF 15   PF 65  INV 40  EV 20    Unloaded increased hindfoot varus with observed  "medial arch--collapses with wt. bearing    Sensation and reflexes:     Beighton's score of 6/10    Palpation and joint mobility:     TTP distal FDL, post tib   Noted atrophy deep, short foot flexors          Therapeutic Treatments and Modalities:     Therapeutic Treatment and Modalities Summary: Seated heel slides with foot flat and end range inv/ev w/ toes flat  Sls balance on wedge in DF--hep  Bridge marching with R l.e. lift and hold--hep  Fit L orthotic with medial posting 1st ray and calcaneous  Instructed patient to get orthotics    Time-based treatments/modalities:    Physical Therapy Timed Treatment Charges  Therapeutic exercise minutes (CPT 93697): 15 minutes      Assessment, Response and Plan:   Assessment details:  Patient presents with progressive chronic L foot pain with noted medial arch collapse and increased toe clawing with mid-stance.  Patient presented with 3+/5 strength tib.post., and weak g-med/min on left as compared to R.  Patient demonstrated improved sls balance with medial posting to left orthotic. Patients presents with 6/9 Beighton score for hypermobility. Patient should do well for functional goals if compliant with POC  Prognosis: fair    Goals:   Short Term Goals:   wAlk more than 1/2 mile w/o pain  Heel raises x 10 w/o pain  Stand for than 30' w/o pain  Sls blance on L > 5\"    LEFS >63/80  Short term goal time span:  2-4 weeks      Long Term Goals:    wAlk more than 1 mile w/o pain  Heel raises x 20 w/o pain  Stand for than 60' w/o pain  Sls blance on L > 10\"    LEFS 65/80  Long term goal time span:  6-8 weeks    Plan:   Therapy options:  Physical therapy treatment to continue  Planned therapy interventions:  Manual Therapy (CPT 72691), Gait Training (CPT 17217) and Therapeutic Exercise (CPT 85966)  Other planned therapy interventions:  Dry needle  Frequency:  2x week  Duration in weeks:  8  Duration in visits:  16  Plan details:  Gait trg, ART, iastm cupoping there ex, e-stim, mobs, " DN  Fit orthotics      Functional Assessment Used        Referring provider co-signature:  I have reviewed this plan of care and my co-signature certifies the need for services.    Certification Period: 11/29/2023 to  01/31/24    Physician Signature: ________________________________ Date: ______________

## 2023-12-05 ENCOUNTER — PHYSICAL THERAPY (OUTPATIENT)
Dept: PHYSICAL THERAPY | Facility: REHABILITATION | Age: 58
End: 2023-12-05
Attending: ORTHOPAEDIC SURGERY
Payer: COMMERCIAL

## 2023-12-05 DIAGNOSIS — M21.41 FALLEN ARCHES: ICD-10-CM

## 2023-12-05 DIAGNOSIS — M21.42 FALLEN ARCHES: ICD-10-CM

## 2023-12-05 DIAGNOSIS — M79.672 LEFT FOOT PAIN: ICD-10-CM

## 2023-12-05 PROCEDURE — 97116 GAIT TRAINING THERAPY: CPT

## 2023-12-05 PROCEDURE — 97140 MANUAL THERAPY 1/> REGIONS: CPT

## 2023-12-05 PROCEDURE — 97110 THERAPEUTIC EXERCISES: CPT

## 2023-12-05 NOTE — OP THERAPY DAILY TREATMENT
"  Outpatient Physical Therapy  DAILY TREATMENT     [facility] Physical [address].  [address]  [place] [address]  Phone:  [phone]  Fax:  [phone]    Date: 12/05/2023    Patient: Sharath Salazar  YOB: 1965  MRN: [id number]     Time Calculation    Start time: 0855  Stop time: 1000 Time Calculation (min): 65 minutes         Chief Complaint: No chief complaint on file.    Visit #: 5    SUBJECTIVE:  Different foot pain, not worse    OBJECTIVE:  Sls L foot <2\"            Therapeutic Treatments and Modalities:     Therapeutic Treatment and Modalities Summary: Fit for medial and met head pad --added 1/4\" post   S/l running man  Gait trg. For t-plane pelvic motion, arm sing  Fitter with focus on t-plane motion  S/l top  leg ER(clams)  w/ knee bent and in front of bottom--IR--raise foot as high as possible--performed @  90/45/0  degrees hip    Bridge marching      Time-based treatments/modalities:    Physical Therapy Timed Treatment Charges  Gait training minutes (CPT 55709): 20 minutes  Manual therapy minutes (CPT 10413): 5 minutes  Therapeutic exercise minutes (CPT 65273): 30 minutes      Pain rating (1-10) before treatment:  1  Pain rating (1-10) after treatment:  1    ASSESSMENT:   Patient demonstrated improved late stance phase resupination and mid-foot--improved stance phase with medial posting.  Cont. Weak  IR control and pelvic control in mid-stance    PLAN/RECOMMENDATIONS:   Orthotic fit, hip trg, gait trg.       "

## 2023-12-07 ENCOUNTER — PHYSICAL THERAPY (OUTPATIENT)
Dept: PHYSICAL THERAPY | Facility: REHABILITATION | Age: 58
End: 2023-12-07
Attending: ORTHOPAEDIC SURGERY
Payer: COMMERCIAL

## 2023-12-07 DIAGNOSIS — M79.672 LEFT FOOT PAIN: ICD-10-CM

## 2023-12-07 PROCEDURE — 97140 MANUAL THERAPY 1/> REGIONS: CPT

## 2023-12-07 PROCEDURE — 97110 THERAPEUTIC EXERCISES: CPT

## 2023-12-07 PROCEDURE — 97116 GAIT TRAINING THERAPY: CPT

## 2023-12-07 NOTE — OP THERAPY DAILY TREATMENT
"  Outpatient Physical Therapy  DAILY TREATMENT     [facility] Physical [address].  [address]  [place] [address]  Phone:  [phone]  Fax:  [phone]    Date: 12/07/2023    Patient: Sharath Salazar  YOB: 1965  MRN: [id number]     Time Calculation    Start time: 0849  Stop time: 0930 Time Calculation (min): 41 minutes         Chief Complaint: No chief complaint on file.    Visit #: 6    SUBJECTIVE:  About the same, definetly not worse.  Struggle wot walk but improving with control    OBJECTIVE:  TTP FDL, post lateral malleolar region            Therapeutic Treatments and Modalities:     Therapeutic Treatment and Modalities Summary: Reviewed HEPa d focus on stbilization  Gait trg. For t-plane pelvic motion, arm swing--reviewed patterning with focus on g max recruitment during mid stance  DN: Patient signed informed written release and verbally agreed with informed consent to procedure of dry needling   skin prep with Chlora prep  -L post tib,FDL--needle piston and spinning    -MHP x 10'  -No adverse reactions observed post treatment// \"no medial arch         Time-based treatments/modalities:    Physical Therapy Timed Treatment Charges  Gait training minutes (CPT 28698): 10 minutes  Manual therapy minutes (CPT 11269): 15 minutes  Therapeutic exercise minutes (CPT 90772): 15 minutes      Pain rating (1-10) before treatment:  1 L medial arch ache  Pain rating (1-10) after treatment:  no medial arch pain now\"    ASSESSMENT:   Ttp FDL, post tib and post lateral malleolar tenosynovitis  Pt. Reported less pain after treatment with improved t-plane pelvic motion with ambulation  PLAN/RECOMMENDATIONS:   Orthotic fit, hip trg, gait trg.       "

## 2023-12-12 ENCOUNTER — PHYSICAL THERAPY (OUTPATIENT)
Dept: PHYSICAL THERAPY | Facility: REHABILITATION | Age: 58
End: 2023-12-12
Attending: ORTHOPAEDIC SURGERY
Payer: COMMERCIAL

## 2023-12-12 DIAGNOSIS — M79.672 LEFT FOOT PAIN: ICD-10-CM

## 2023-12-12 PROCEDURE — 97140 MANUAL THERAPY 1/> REGIONS: CPT

## 2023-12-12 PROCEDURE — 97110 THERAPEUTIC EXERCISES: CPT

## 2023-12-12 NOTE — OP THERAPY DAILY TREATMENT
"  Outpatient Physical Therapy  DAILY TREATMENT     [facility] Physical [address]  [address]  [place] [address]  Phone:  [phone]  Fax:  [phone]    Date: 12/12/2023    Patient: Sharath Salazar  YOB: 1965  MRN: [id number]     Time Calculation    Start time: 0847  Stop time: 0932 Time Calculation (min): 45 minutes         Chief Complaint: No chief complaint on file.    Visit #: 7    SUBJECTIVE:  Able to control walking pattern.  Musclee are sore but in a good way due to strength in hip region--foot is not worse    OBJECTIVE:  Modification to orthitcs with in lateral post in forefoot            Therapeutic Treatments and Modalities:     Therapeutic Treatment and Modalities Summary: Modified orthotics with medial forefoot posting and movved lateral metatarsal post anterior 1/2\"// improved stability in mid-stance  Cuneiform/navicular mobs  1-2 met and cuneiform mobs  P-fascia medial arch tape  Gait reviewed// improved resupination with tape             Time-based treatments/modalities:    Physical Therapy Timed Treatment Charges  Manual therapy minutes (CPT 88130): 30 minutes  Therapeutic exercise minutes (CPT 58111): 10 minutes      Pain rating (1-10) before treatment:  1 L medial arch ache  Pain rating (1-10) after treatment:  no medial arch pain now\"    ASSESSMENT:   Noted hypomobile 1-2 cuneiform navicular and 1-2 met--improving balance and noted decreased toe clawing with p-=fascia tape and whhile standing on orthotic with medial posting  PLAN/RECOMMENDATIONS:   Orthotic fit, hip trg, gait trg. Tarsal mobs, DN?, balance progression into P-flexion       "

## 2023-12-14 ENCOUNTER — PHYSICAL THERAPY (OUTPATIENT)
Dept: PHYSICAL THERAPY | Facility: REHABILITATION | Age: 58
End: 2023-12-14
Attending: ORTHOPAEDIC SURGERY
Payer: COMMERCIAL

## 2023-12-14 DIAGNOSIS — M21.42 FALLEN ARCHES: ICD-10-CM

## 2023-12-14 DIAGNOSIS — M21.41 FALLEN ARCHES: ICD-10-CM

## 2023-12-14 DIAGNOSIS — M79.672 LEFT FOOT PAIN: ICD-10-CM

## 2023-12-14 PROCEDURE — 97110 THERAPEUTIC EXERCISES: CPT

## 2023-12-14 PROCEDURE — 97761 PROSTHETIC TRAING 1ST ENC: CPT

## 2023-12-14 NOTE — OP THERAPY DAILY TREATMENT
"  Outpatient Physical Therapy  DAILY TREATMENT     [facility] Physical [address].  [address]  [place] [address]  Phone:  [phone]  Fax:  [phone]    Date: 12/14/2023    Patient: Sharath Salazar  YOB: 1965  MRN: [id number]     Time Calculation    Start time: 0845  Stop time: 0930 Time Calculation (min): 45 minutes         Chief Complaint: No chief complaint on file.    Visit #: 4    SUBJECTIVE:  Tape felt great with increased tolerance     OBJECTIVE:              Therapeutic Treatments and Modalities:     Therapeutic Treatment and Modalities Summary: Bosu blue 3', 6' sls to fatigue\" surprisingly not worse\"  Heated/formed and Modified orthotics with medial forefoot and heel  posting   Reviewed HEP progression and self tape instruction                 Time-based treatments/modalities:    Physical Therapy Timed Treatment Charges  Prosthetic training initial, minutes (CPT 59465): 15 minutes  Therapeutic exercise minutes (CPT 81098): 20 minutes      Pain rating (1-10) before treatment:  1 L medial arch ache  Pain rating (1-10) after treatment:  not much\"    ASSESSMENT:   Patient reported significant relief with standing, walking and daily pain as long as medial arch/p-fascia tape was on.  Tolerated balance ex to fatigue w/o increaed pain  PLAN/RECOMMENDATIONS:   Orthotic fit, hip trg, gait trg. Tarsal mobs, DN?, balance progression into P-flexion       "

## 2023-12-19 ENCOUNTER — APPOINTMENT (OUTPATIENT)
Dept: PHYSICAL THERAPY | Facility: REHABILITATION | Age: 58
End: 2023-12-19
Attending: ORTHOPAEDIC SURGERY
Payer: COMMERCIAL

## 2023-12-21 ENCOUNTER — APPOINTMENT (OUTPATIENT)
Dept: PHYSICAL THERAPY | Facility: REHABILITATION | Age: 58
End: 2023-12-21
Attending: ORTHOPAEDIC SURGERY
Payer: COMMERCIAL

## 2023-12-26 ENCOUNTER — APPOINTMENT (OUTPATIENT)
Dept: PHYSICAL THERAPY | Facility: REHABILITATION | Age: 58
End: 2023-12-26
Attending: ORTHOPAEDIC SURGERY
Payer: COMMERCIAL

## 2023-12-28 ENCOUNTER — APPOINTMENT (OUTPATIENT)
Dept: PHYSICAL THERAPY | Facility: REHABILITATION | Age: 58
End: 2023-12-28
Attending: ORTHOPAEDIC SURGERY
Payer: COMMERCIAL

## 2024-01-02 ENCOUNTER — PHYSICAL THERAPY (OUTPATIENT)
Dept: PHYSICAL THERAPY | Facility: REHABILITATION | Age: 59
End: 2024-01-02
Attending: ORTHOPAEDIC SURGERY
Payer: COMMERCIAL

## 2024-01-02 DIAGNOSIS — M79.672 LEFT FOOT PAIN: ICD-10-CM

## 2024-01-02 PROCEDURE — 97110 THERAPEUTIC EXERCISES: CPT

## 2024-01-02 NOTE — OP THERAPY DAILY TREATMENT
"  Outpatient Physical Therapy  DAILY TREATMENT     [facility] Physical Therapy [address].  [address]  [place] [address]  Phone:  [phone]  Fax:  [phone]    Date: 01/02/2024    Patient: Sharath Salazar  YOB: 1965  MRN: [id number]     Time Calculation    Start time: 0930  Stop time: 1005 Time Calculation (min): 35 minutes         Chief Complaint: No chief complaint on file.    Visit #: 4    SUBJECTIVE:  Overall, better but not doing much ex.  Focusing on posture and     OBJECTIVE:              Therapeutic Treatments and Modalities:     Therapeutic Treatment and Modalities Summary: Adjusted orthotic w/ met head pad  Sls carpet slide ir/er control with focus on gluts  Toy soldier # 2  Bridge marching hep               Time-based treatments/modalities:    Physical Therapy Timed Treatment Charges  Therapeutic exercise minutes (CPT 09891): 25 minutes      Pain rating (1-10) before treatment:  1 L medial arch ache  Pain rating (1-10) after treatment:  n  \"ot much\"    ASSESSMENT:   Improved foot posture with balance ex with met head pad placement.  Cont. To struggle with function control of ir/er and weak psot chain strength  PLAN/RECOMMENDATIONS:   Hold 2 weeks to focus on HEP hip trg, gait trg. Tarsal mobs, DN?, balance progression into P-flexion       "

## 2024-02-08 ENCOUNTER — PHYSICAL THERAPY (OUTPATIENT)
Dept: PHYSICAL THERAPY | Facility: REHABILITATION | Age: 59
End: 2024-02-08
Attending: ORTHOPAEDIC SURGERY
Payer: COMMERCIAL

## 2024-02-08 DIAGNOSIS — M79.672 LEFT FOOT PAIN: ICD-10-CM

## 2024-02-08 DIAGNOSIS — M21.41 FALLEN ARCHES: ICD-10-CM

## 2024-02-08 DIAGNOSIS — M21.42 FALLEN ARCHES: ICD-10-CM

## 2024-02-08 PROCEDURE — 97110 THERAPEUTIC EXERCISES: CPT

## 2024-02-08 NOTE — OP THERAPY DAILY TREATMENT
"  Outpatient Physical Therapy  DAILY TREATMENT     [facility] Physical Therapy [address].  [address]  [place] [address]  Phone:  [phone]  Fax:  [phone]    Date: 02/08/2024    Patient: Sharath Salazar  YOB: 1965  MRN: [id number]     Time Calculation    Start time: 0715  Stop time: 0800 Time Calculation (min): 45 minutes         Chief Complaint: No chief complaint on file.    Visit #: 7    SUBJECTIVE:  Doing much better with orthotics and able to tape with high level activity  Walking is easier, and notice  OBJECTIVE:  Noted weak L>R hip ER.IR> hip abd    Lower Extremity Functional Scale Percentage: 86.25       Therapeutic Treatments and Modalities:     Therapeutic Treatment and Modalities Summary: Bridge marching  S/l add with IR and progressive hip extension--hep  Reviewed balance on bosu--blue side up  Running man with focus on hip IR    Time-based treatments/modalities:    Physical Therapy Timed Treatment Charges  Therapeutic exercise minutes (CPT 31540): 40 minutes      Pain rating (1-10) before treatment:  0  Pain rating (1-10) after treatment:  0    ASSESSMENT:   Patient reports improved tolerance to walking and standing and able to ski w/o pain.  Patient able self tape which allows patient to walk w/o significant pain. Pt. Has not \"tested\" how far he can hike but that is the plan over the next month  Pt. Continues to present with motor control with active ,ckc hip extension and IR.    Patient has met his goals and is independent with his HEP  PLAN/RECOMMENDATIONS:   D/c to an independent hep         "

## 2024-02-08 NOTE — OP THERAPY DISCHARGE SUMMARY
"  Outpatient Physical Therapy  DISCHARGE SUMMARY NOTE      [address]  [address]  [facility] [address]  Phone:  [phone]  Fax:  [phone]    Date of Visit: 02/08/2024    Patient: Sharath Salazar  YOB: 1965  MRN: [id number]     Referring Provider: Tyler Torres M.D.  [address] [place] [address]   Referring Diagnosis No admission diagnoses are documented for this encounter.         Functional Assessment Used  Lower Extremity Functional Scale Percentage: 86.25     Your patient is being discharged from Physical Therapy with the following comments:   Goals met    Patient reports that he is Doing much better with orthotics and able to tape his foot and tolerate skiing and walking w/o pain    OBJECTIVE:  Noted weakness  L>R hip  abd >ER.IR>    Lower Extremity Functional Scale Percentage: 86.25       ASSESSMENT:   Patient reports improved tolerance to walking and standing and able to ski w/o pain.  Patient able self tape which allows patient to walk w/o significant pain. Pt. Has not \"tested\" how far he can hike but that is the plan over the next month  Pt. Continues to present with limited motor control with active ,ckc hip extension and IR.    Patient has met his goals and is independent with his HEP  PLAN/RECOMMENDATIONS:   D/c to an independent hep    Tommie Russell, PT, DPT, OCS    Date: 2/8/2024         "

## 2024-04-03 ENCOUNTER — APPOINTMENT (OUTPATIENT)
Dept: PHYSICAL THERAPY | Facility: REHABILITATION | Age: 59
End: 2024-04-03
Attending: ORTHOPAEDIC SURGERY
Payer: COMMERCIAL

## 2024-06-30 ENCOUNTER — OFFICE VISIT (OUTPATIENT)
Dept: URGENT CARE | Facility: CLINIC | Age: 59
End: 2024-06-30
Payer: COMMERCIAL

## 2024-06-30 VITALS
RESPIRATION RATE: 18 BRPM | BODY MASS INDEX: 25.6 KG/M2 | DIASTOLIC BLOOD PRESSURE: 78 MMHG | SYSTOLIC BLOOD PRESSURE: 132 MMHG | HEART RATE: 74 BPM | TEMPERATURE: 98.5 F | WEIGHT: 193.2 LBS | HEIGHT: 73 IN | OXYGEN SATURATION: 96 %

## 2024-06-30 DIAGNOSIS — U07.1 COVID-19: ICD-10-CM

## 2024-06-30 DIAGNOSIS — Z20.822 CLOSE EXPOSURE TO COVID-19 VIRUS: ICD-10-CM

## 2024-06-30 LAB
FLUAV RNA SPEC QL NAA+PROBE: NEGATIVE
FLUBV RNA SPEC QL NAA+PROBE: NEGATIVE
RSV RNA SPEC QL NAA+PROBE: NEGATIVE
SARS-COV-2 RNA RESP QL NAA+PROBE: POSITIVE

## 2024-06-30 ASSESSMENT — ENCOUNTER SYMPTOMS
HEADACHES: 1
HOARSE VOICE: 1
COUGH: 1

## 2024-07-01 NOTE — RESULT ENCOUNTER NOTE
Alejandro Early,    You are positive for COVID-19.  I sent the Paxlovid to your pharmacy.  I hope you feel better soon!    Kind regards,  Collin

## 2024-07-01 NOTE — PROGRESS NOTES
"Subjective:   Sharath Salazar is a 58 y.o. male who presents for Pharyngitis (X today Getting worse very fast, cough, headache, congestion, and fever. Partner tested positive.)        Pharyngitis   This is a new problem. The current episode started yesterday. The problem has been rapidly worsening. Maximum temperature: tactile fever. The pain is mild. Associated symptoms include congestion, coughing, headaches and a hoarse voice. Associated symptoms comments: Body aches. Exposure to: partner Covid 19+. He has tried NSAIDs for the symptoms. The treatment provided mild relief.     Review of Systems   HENT:  Positive for congestion and hoarse voice.    Respiratory:  Positive for cough.    Neurological:  Positive for headaches.       PMH:  has a past medical history of Arrhythmia.  MEDS:   Current Outpatient Medications:     Nirmatrelvir&Ritonavir 300/100 20 x 150 MG & 10 x 100MG Tablet Therapy Pack, Take 300 mg nirmatrelvir (two 150 mg tablets) with 100 mg ritonavir (one 100 mg tablet) by mouth, with all three tablets taken together twice daily for 5 days., Disp: 30 Each, Rfl: 0  ALLERGIES:   Allergies   Allergen Reactions    Nkda [No Known Drug Allergy]      SURGHX:   Past Surgical History:   Procedure Laterality Date    SHOULDER ARTHROSCOPY W/ ROTATOR CUFF REPAIR       SOCHX:  reports that he has never smoked. He has never used smokeless tobacco. He reports current alcohol use. He reports that he does not use drugs.  FH: Family history was reviewed, no pertinent findings to report   Objective:   /78   Pulse 74   Temp 36.9 °C (98.5 °F) (Temporal)   Resp 18   Ht 1.854 m (6' 1\")   Wt 87.6 kg (193 lb 3.2 oz)   SpO2 96%   BMI 25.49 kg/m²   Physical Exam  Vitals reviewed.   Constitutional:       General: He is not in acute distress.     Appearance: Normal appearance. He is well-developed. He is not toxic-appearing.   HENT:      Head: Normocephalic and atraumatic.      Right Ear: External ear normal.      " Left Ear: External ear normal.      Nose: Nose normal.      Mouth/Throat:      Lips: Pink.      Mouth: Mucous membranes are moist.      Pharynx: Oropharynx is clear. Uvula midline. Posterior oropharyngeal erythema present.   Cardiovascular:      Rate and Rhythm: Normal rate and regular rhythm.      Heart sounds: Normal heart sounds, S1 normal and S2 normal.   Pulmonary:      Effort: Pulmonary effort is normal. No respiratory distress.      Breath sounds: Normal breath sounds. No stridor. No decreased breath sounds, wheezing, rhonchi or rales.   Skin:     General: Skin is dry.   Neurological:      Comments: Alert and oriented.    Psychiatric:         Speech: Speech normal.         Behavior: Behavior normal.           Results for orders placed or performed in visit on 06/30/24   POCT CoV-2, Flu A/B, RSV by PCR   Result Value Ref Range    SARS-CoV-2 by PCR Positive (A) Negative, Invalid    Influenza virus A RNA Negative Negative, Invalid    Influenza virus B, PCR Negative Negative, Invalid    RSV, PCR Negative Negative, Invalid       Assessment/Plan:   1. Close exposure to COVID-19 virus  - POCT CoV-2, Flu A/B, RSV by PCR    2. COVID-19  - Nirmatrelvir&Ritonavir 300/100 20 x 150 MG & 10 x 100MG Tablet Therapy Pack; Take 300 mg nirmatrelvir (two 150 mg tablets) with 100 mg ritonavir (one 100 mg tablet) by mouth, with all three tablets taken together twice daily for 5 days.  Dispense: 30 Each; Refill: 0    Positive for Covid 19. Pt has good understanding of etiology and disease course. Quarantine [place]AW CDC guidelines- guidelines reviewed.    Pt qualifies for treatment with Paxlovid. Paxlovid risks, benefits, side effects reviewed. Medications reviewed for potential interactions. Renal dose adjustment not indicated.    Patient may also continue symptomatic care with 12-hour Mucinex and antitussives as needed.  Recommend reevaluation with any new or worsening symptoms.  If patient develops severe symptoms such as shortness  of breath, chest pain, difficulty breathing, pain with breathing or other severe and concerning symptoms-to ED for reevaluation.